# Patient Record
Sex: FEMALE | Race: WHITE | Employment: OTHER | URBAN - METROPOLITAN AREA
[De-identification: names, ages, dates, MRNs, and addresses within clinical notes are randomized per-mention and may not be internally consistent; named-entity substitution may affect disease eponyms.]

---

## 2023-04-25 ENCOUNTER — ANESTHESIA EVENT (OUTPATIENT)
Dept: ENDOSCOPY | Age: 78
End: 2023-04-25
Payer: MEDICARE

## 2023-04-26 ENCOUNTER — HOSPITAL ENCOUNTER (OUTPATIENT)
Age: 78
Setting detail: OUTPATIENT SURGERY
Discharge: HOME | End: 2023-04-26
Attending: INTERNAL MEDICINE | Admitting: INTERNAL MEDICINE
Payer: MEDICARE

## 2023-04-26 ENCOUNTER — ANESTHESIA (OUTPATIENT)
Dept: ENDOSCOPY | Age: 78
End: 2023-04-26
Payer: MEDICARE

## 2023-04-26 VITALS
DIASTOLIC BLOOD PRESSURE: 73 MMHG | RESPIRATION RATE: 14 BRPM | SYSTOLIC BLOOD PRESSURE: 114 MMHG | HEIGHT: 60 IN | BODY MASS INDEX: 25.13 KG/M2 | WEIGHT: 128 LBS | OXYGEN SATURATION: 97 % | TEMPERATURE: 97.1 F | HEART RATE: 81 BPM

## 2023-04-26 PROCEDURE — 3700000000 HC ANESTHESIA ATTENDED CARE: Performed by: INTERNAL MEDICINE

## 2023-04-26 PROCEDURE — 2580000003 HC RX 258: Performed by: NURSE ANESTHETIST, CERTIFIED REGISTERED

## 2023-04-26 PROCEDURE — 2580000003 HC RX 258: Performed by: ANESTHESIOLOGY

## 2023-04-26 PROCEDURE — 6360000002 HC RX W HCPCS: Performed by: NURSE ANESTHETIST, CERTIFIED REGISTERED

## 2023-04-26 PROCEDURE — 7100000010 HC PHASE II RECOVERY - FIRST 15 MIN: Performed by: INTERNAL MEDICINE

## 2023-04-26 PROCEDURE — 2709999900 HC NON-CHARGEABLE SUPPLY: Performed by: INTERNAL MEDICINE

## 2023-04-26 PROCEDURE — 7100000011 HC PHASE II RECOVERY - ADDTL 15 MIN: Performed by: INTERNAL MEDICINE

## 2023-04-26 PROCEDURE — 3609017100 HC EGD: Performed by: INTERNAL MEDICINE

## 2023-04-26 PROCEDURE — 3700000001 HC ADD 15 MINUTES (ANESTHESIA): Performed by: INTERNAL MEDICINE

## 2023-04-26 RX ORDER — TIZANIDINE 2 MG/1
TABLET ORAL
COMMUNITY
Start: 2023-02-13

## 2023-04-26 RX ORDER — LIDOCAINE HYDROCHLORIDE 20 MG/ML
INJECTION, SOLUTION INTRAVENOUS PRN
Status: DISCONTINUED | OUTPATIENT
Start: 2023-04-26 | End: 2023-04-26 | Stop reason: SDUPTHER

## 2023-04-26 RX ORDER — CELECOXIB 200 MG/1
200 CAPSULE ORAL 2 TIMES DAILY
COMMUNITY
Start: 2023-03-25

## 2023-04-26 RX ORDER — M-VIT,TX,IRON,MINS/CALC/FOLIC 27MG-0.4MG
1 TABLET ORAL DAILY
COMMUNITY

## 2023-04-26 RX ORDER — LOSARTAN POTASSIUM 100 MG/1
100 TABLET ORAL DAILY
COMMUNITY
Start: 2023-02-21

## 2023-04-26 RX ORDER — LIDOCAINE 50 MG/G
PATCH TOPICAL
COMMUNITY
Start: 2023-03-09

## 2023-04-26 RX ORDER — VITAMIN E 268 MG
1 CAPSULE ORAL
COMMUNITY

## 2023-04-26 RX ORDER — SODIUM CHLORIDE, SODIUM LACTATE, POTASSIUM CHLORIDE, CALCIUM CHLORIDE 600; 310; 30; 20 MG/100ML; MG/100ML; MG/100ML; MG/100ML
INJECTION, SOLUTION INTRAVENOUS CONTINUOUS
Status: DISCONTINUED | OUTPATIENT
Start: 2023-04-26 | End: 2023-04-26 | Stop reason: HOSPADM

## 2023-04-26 RX ORDER — DULOXETIN HYDROCHLORIDE 30 MG/1
CAPSULE, DELAYED RELEASE ORAL DAILY
COMMUNITY
Start: 2023-03-28

## 2023-04-26 RX ORDER — HYDROCHLOROTHIAZIDE 25 MG/1
25 TABLET ORAL EVERY MORNING
COMMUNITY
Start: 2023-01-16

## 2023-04-26 RX ORDER — AZITHROMYCIN MONOHYDRATE 10 MG/ML
SOLUTION/ DROPS OPHTHALMIC
COMMUNITY
Start: 2019-11-21

## 2023-04-26 RX ORDER — ASPIRIN 81 MG/1
81 TABLET ORAL DAILY
COMMUNITY
Start: 2022-11-13

## 2023-04-26 RX ORDER — TRAMADOL HYDROCHLORIDE 50 MG/1
TABLET ORAL
COMMUNITY
Start: 2023-04-14

## 2023-04-26 RX ORDER — CHLORAL HYDRATE 500 MG
2000 CAPSULE ORAL EVERY MORNING
COMMUNITY

## 2023-04-26 RX ORDER — HYDROXYZINE HYDROCHLORIDE 10 MG/1
TABLET, FILM COATED ORAL
COMMUNITY
Start: 2022-10-04

## 2023-04-26 RX ORDER — SIMVASTATIN 40 MG
40 TABLET ORAL EVERY EVENING
COMMUNITY
Start: 2023-03-30

## 2023-04-26 RX ORDER — FLUTICASONE PROPIONATE 50 MCG
SPRAY, SUSPENSION (ML) NASAL
COMMUNITY
Start: 2023-03-25

## 2023-04-26 RX ORDER — PROPOFOL 10 MG/ML
INJECTION, EMULSION INTRAVENOUS PRN
Status: DISCONTINUED | OUTPATIENT
Start: 2023-04-26 | End: 2023-04-26 | Stop reason: SDUPTHER

## 2023-04-26 RX ORDER — SODIUM CHLORIDE, SODIUM LACTATE, POTASSIUM CHLORIDE, CALCIUM CHLORIDE 600; 310; 30; 20 MG/100ML; MG/100ML; MG/100ML; MG/100ML
INJECTION, SOLUTION INTRAVENOUS CONTINUOUS PRN
Status: DISCONTINUED | OUTPATIENT
Start: 2023-04-26 | End: 2023-04-26 | Stop reason: SDUPTHER

## 2023-04-26 RX ORDER — PANTOPRAZOLE SODIUM 40 MG/1
TABLET, DELAYED RELEASE ORAL
COMMUNITY
Start: 2023-03-02

## 2023-04-26 RX ORDER — LACTOBACILLUS RHAMNOSUS GG 10B CELL
1 CAPSULE ORAL DAILY
COMMUNITY

## 2023-04-26 RX ORDER — PROPOFOL 10 MG/ML
INJECTION, EMULSION INTRAVENOUS CONTINUOUS PRN
Status: DISCONTINUED | OUTPATIENT
Start: 2023-04-26 | End: 2023-04-26 | Stop reason: SDUPTHER

## 2023-04-26 RX ORDER — TRAZODONE HYDROCHLORIDE 50 MG/1
TABLET ORAL
COMMUNITY
Start: 2023-02-21

## 2023-04-26 RX ADMIN — SODIUM CHLORIDE, SODIUM LACTATE, POTASSIUM CHLORIDE, AND CALCIUM CHLORIDE: .6; .31; .03; .02 INJECTION, SOLUTION INTRAVENOUS at 13:57

## 2023-04-26 RX ADMIN — PROPOFOL 50 MG: 10 INJECTION, EMULSION INTRAVENOUS at 14:05

## 2023-04-26 RX ADMIN — LIDOCAINE HYDROCHLORIDE 100 MG: 20 INJECTION, SOLUTION INTRAVENOUS at 14:05

## 2023-04-26 RX ADMIN — SODIUM CHLORIDE, POTASSIUM CHLORIDE, SODIUM LACTATE AND CALCIUM CHLORIDE: 600; 310; 30; 20 INJECTION, SOLUTION INTRAVENOUS at 12:40

## 2023-04-26 RX ADMIN — PROPOFOL 150 MCG/KG/MIN: 10 INJECTION, EMULSION INTRAVENOUS at 14:05

## 2023-04-26 ASSESSMENT — PAIN SCALES - GENERAL: PAINLEVEL_OUTOF10: 0

## 2023-04-26 ASSESSMENT — PAIN - FUNCTIONAL ASSESSMENT: PAIN_FUNCTIONAL_ASSESSMENT: 0-10

## 2023-04-26 ASSESSMENT — PAIN SCALES - WONG BAKER: WONGBAKER_NUMERICALRESPONSE: 0

## 2023-04-26 NOTE — ANESTHESIA POSTPROCEDURE EVALUATION
Department of Anesthesiology  Postprocedure Note    Patient: Matthew López  MRN: [id number]  YOB: 1945  Date of evaluation: 4/26/2023      Procedure Summary     Date: 04/26/23 Room / Location: [facility]    Anesthesia Start: 3205 Anesthesia Stop: 7012    Procedure: ESOPHAGOGASTRODUODENOSCOPY Diagnosis:       Melena      (Melena [K92.1])    Surgeons: Winifred Zhou MD Responsible Provider: Delfino Santana DO    Anesthesia Type: MAC ASA Status: 2          Anesthesia Type: No value filed.     Navi Phase I: Navi Score: 10    Navi Phase II: Navi Score: 10      Anesthesia Post Evaluation    Patient location during evaluation: PACU  Patient participation: complete - patient participated  Level of consciousness: awake and alert  Airway patency: patent  Nausea & Vomiting: no nausea and no vomiting  Complications: no  Cardiovascular status: hemodynamically stable  Respiratory status: acceptable  Hydration status: euvolemic  Multimodal analgesia pain management approach

## 2023-04-26 NOTE — ANESTHESIA PRE PROCEDURE
Department of Anesthesiology  Preprocedure Note       Name:  Marilyn Galvez   Age:  77 y.o.  :  1945                                          MRN:  [id number]         Date:  2023      Surgeon: Silvano Grimm):  Roselia Barcenas MD    Procedure: Procedure(s):  ESOPHAGOGASTRODUODENOSCOPY    Medications prior to admission:   Prior to Admission medications    Medication Sig Start Date End Date Taking? Authorizing Provider   aspirin 81 MG EC tablet Take 1 tablet by mouth daily 22  Yes Historical Provider, MD   azithromycin (AZASITE) 1 % ophthalmic solution Apply 1 drop by Topical route to eyelid margins both eyes bedtime 3 x weekly 19  Yes Historical Provider, MD   hydroCHLOROthiazide (HYDRODIURIL) 25 MG tablet Take 1 tablet by mouth every morning 23  Yes Historical Provider, MD   hydrOXYzine HCl (ATARAX) 10 MG tablet 1-2 po tid prn anxiety 10/4/22  Yes Historical Provider, MD   Calcium-Cholecalciferol 200-6.25 MG-MCG TABS Take 1 tablet by mouth daily    Historical Provider, MD   celecoxib (CELEBREX) 200 MG capsule Take 1 capsule by mouth 2 times daily 3/25/23   Historical Provider, MD   cyanocobalamin 100 MCG tablet Take 1 tablet by mouth daily    Historical Provider, MD   DULoxetine (CYMBALTA) 30 MG extended release capsule Take by mouth daily 3/28/23   Historical Provider, MD   fluticasone (FLONASE) 50 MCG/ACT nasal spray SPRAY 2 SPRAYS INTO EACH NOSTRIL EVERY DAY 3/25/23   Historical Provider, MD   lactobacillus (CULTURELLE) CAPS capsule Take 1 capsule by mouth daily    Historical Provider, MD   lidocaine (LIDODERM) 5 % APPLY 3 PATCHES DAILY.  LEAVE ON FOR 12 HOURS THEN LEAVE OFF FOR 12 HOURS 3/9/23   Historical Provider, MD   losartan (COZAAR) 100 MG tablet Take 1 tablet by mouth daily 23   Historical Provider, MD   Multiple Vitamins-Minerals (THERAPEUTIC MULTIVITAMIN-MINERALS) tablet Take 1 tablet by mouth daily    Historical Provider, MD   Omega-3 Fatty Acids (FISH OIL) 1000 MG

## 2023-04-26 NOTE — OP NOTE
Endoscopy Note    Patient: Sudheer Peres  : 1945  CSN:     Procedure: Esophagogastroduodenoscopy     Date:  2023     Surgeon:  Nicolasa Roper MD     Referring Physician: Glenny Contreras MD     Preoperative Diagnosis:  Melena    Postoperative Diagnosis:  Hiatal hernia, duodenal erosion    Anesthesia:  MAC per anesthesia record     EBL: <5 mL    Indications: This is a 77y.o. year old female who presents today with history of intermittent melenic stools. Chronic NSAID/aspirin use and prior Nissen fundoplication. Procedure:  Informed consent was obtained from the patient after explanation of indications, benefits and possible risks and complications of the procedure. The patient was then taken to the endoscopy suite, placed in the left lateral decubitus position, and the above IV sedation was administrered. The Olympus gastroscope was passed through the hypopharynx into the esophagus. The scope was advanced to the second portion of the duodenum. The mucosa was carefully examined, including gastric retroflexion. The scope was withdrawn and the procedure was terminated with findings as indicated below:     Findings:   Esophagus:   -Z-line was noted at 36cm with a 3cm hiatal hernia noted. -The esophagus was otherwise normal. No evidence of esophagitis or Lees's esophagus. Stomach:   -Partially intact Nissen fundoplication noted on retroflexion. 60% of the wrap appeared to be intact.   -The stomach was otherwise normal.     Duodenum:   -A single 2mm erosion was noted in the duodenal bulb with normal surrounding mucosa. -The examined duodenum was otherwise normal.     Gastric or Duodenal ulcer present: No      The patient tolerated the procedure well and was taken to the post anesthesia care unit in good condition. Complications: No immediate complications.      Impression:    -Partially intact Nissen fundoplication   -Small hiatal hernia   -Isolated duodenal erosion

## 2023-04-26 NOTE — DISCHARGE INSTRUCTIONS
ENDOSCOPY DISCHARGE INSTRUCTIONS:    Call the physician that did your procedure for any questions or concern:    [facility]: [phone]  DR. TREMAYNE WAKEFIELD      ACTIVITY:    There are potential side effects to the medications used for sedation and anesthesia during your procedure. These include:  Dizziness or light-headedness, confusion or memory loss, delayed reaction times, loss of coordination, nausea and vomiting. Because of your increased risk for injury, we ask that you observe the following precautions: For the next 24 hours,  DO NOT operate an automobile, bicycle, motorcycle, , power tools or large equipment of any kind. Do not drink alcohol, sign any legal documents or make any legal decisions for 24 hours. Do not bend your head over lower than your heart. DO sit on the side of bed/couch awhile before getting up. Plan on bedrest or quiet relaxation today. You may resume normal activities in 24 hours. DIET:    Your first meal today should be light, avoiding spicy and fatty foods. If you tolerate this first meal, then you may advance to your regular diet unless otherwise advised by your physician. NORMAL SYMPTOMS:  -Mild sore throat if youve had an EGD   -Gaseous discomfort    NOTIFY YOUR PHYSICIAN IF THESE SYMPTOMS OCCUR:  1. Fever (greater than 100)  5. Increased abdominal bloating  2. Severe pain    6. Excessive bleeding  3. Nausea and vomiting  7. Chest pain                                                                    4. Chills    8. Shortness of breath    ADDITIONAL INSTRUCTIONS:    Biopsy results: NONE    Educational Information:          Please review these discharge instructions this evening or tomorrow for  information you may have forgotten. We want to thank you for choosing the [facility] as your health care provider. We always strive to provide you with excellent care while you are here.  You may receive a survey in the mail regarding your

## 2023-04-26 NOTE — H&P
Gastroenterology Note             Pre-operative History and Physical    Patient: Adri Hong  : 1945  CSN: [id number]    History Obtained From:  Patient and/or guardian. HISTORY OF PRESENT ILLNESS:    Indication: The patient is a 77 y.o. female  here for EGD. Prior history of intermittent melena. Chronic ASA / NSAID use. Past Medical History:    Past Medical History:   Diagnosis Date    [place] (hard of hearing)     Hyperlipidemia     Hypertension     Spinal cord stimulator status     TIA (transient ischemic attack)      Past Surgical History:    Past Surgical History:   Procedure Laterality Date    JOINT REPLACEMENT       Medications Prior to Admission:   No current facility-administered medications on file prior to encounter. Current Outpatient Medications on File Prior to Encounter   Medication Sig Dispense Refill    aspirin 81 MG EC tablet Take 1 tablet by mouth daily      azithromycin (AZASITE) 1 % ophthalmic solution Apply 1 drop by Topical route to eyelid margins both eyes bedtime 3 x weekly      hydroCHLOROthiazide (HYDRODIURIL) 25 MG tablet Take 1 tablet by mouth every morning      hydrOXYzine HCl (ATARAX) 10 MG tablet 1-2 po tid prn anxiety      Calcium-Cholecalciferol 200-6.25 MG-MCG TABS Take 1 tablet by mouth daily      celecoxib (CELEBREX) 200 MG capsule Take 1 capsule by mouth 2 times daily      cyanocobalamin 100 MCG tablet Take 1 tablet by mouth daily      DULoxetine (CYMBALTA) 30 MG extended release capsule Take by mouth daily      fluticasone (FLONASE) 50 MCG/ACT nasal spray SPRAY 2 SPRAYS INTO EACH NOSTRIL EVERY DAY      lactobacillus (CULTURELLE) CAPS capsule Take 1 capsule by mouth daily      lidocaine (LIDODERM) 5 % APPLY 3 PATCHES DAILY.  LEAVE ON FOR 12 HOURS THEN LEAVE OFF FOR 12 HOURS      losartan (COZAAR) 100 MG tablet Take 1 tablet by mouth daily      Multiple Vitamins-Minerals (THERAPEUTIC MULTIVITAMIN-MINERALS) tablet Take 1 tablet by mouth daily

## 2023-04-26 NOTE — PROGRESS NOTES
Ambulatory Surgery/Procedure Discharge Note    Vitals:    04/26/23 1430   BP: 114/73   Pulse: 81   Resp: 14   Temp: 97.1 °F (36.2 °C)   SpO2: 97%       In: 400 [I.V.:400]  Out: -     Restroom use offered before discharge. Yes  Pt tolerates PO well and denies nausea. Toileted and remains safe. Discharge instructions  were read at bedside. Pain assessment:  none  Pain Level: 0        Patient discharged to home/self care.  Patient discharged via wheel chair by this nurse to waiting family/S.O.       4/26/2023 3:12 PM

## 2024-09-24 RX ORDER — CEPHALEXIN 500 MG/1
500 CAPSULE ORAL 2 TIMES DAILY
COMMUNITY

## 2024-09-24 RX ORDER — LEVOTHYROXINE SODIUM 50 UG/1
50 TABLET ORAL DAILY
COMMUNITY

## 2024-09-24 NOTE — PROGRESS NOTES
[facility] PRE-OPERATIVE INSTRUCTIONS       DOS: __10/4/24__        Pre-Op Instructions     Patients receiving local anesthetic only will arrive one hour prior to the procedure, all other patients will arrive 1.5 hours prior to procedure time.    [x]  A History and Physical will be required within 30 days prior to surgery date. Some patients may require cardiac or pulmonary clearance. H&P will be completed DOS for Endo/colonoscopy patients.     [x]  Reviewed Medical and Surgical history, medication list, confirmed with patient any implants, allergies, bleeding disorders, YESICA and reactions to Anesthesia.    [x]  If there is a change in physical condition between now and the day of surgery, please notify your surgeon. This includes a cough, cold, fever, sore throat, nausea, vomiting and diarrhea. Also notify your surgeon if you experience dizziness, shortness of breath or blurred vision.    [x] Reviewed hx of C-Diff, MRSA, VRE and/or recent use of Antibiotics     [x]  All patients having a procedure must have a ride home by a responsible person that is over the age of 18 and ensure it is someone that we can share medical information with. After discharge, a responsible adult needs to stay with you for 24 hours. There is a limit of 2 adult visitors per room.     If unable to secure ride and/or care taker, please contact surgeon's office.      [x]  No alcohol, smoking or marijuana use 24 hours prior to surgery. Any use of recreational drugs must be stopped 5 days prior to surgery.     [x]  NPO after midnight (Any heart, BP, seizure, thyroid and breathing medications are okay to take the morning of surgery with a small sip of water 4 hours prior to procedure).    The morning of surgery, you may brush your teeth, just no swallowing water. Also, NO gum, candy, mints or ice chips.    [x]  For Colonoscopy's, follow prep-instructions as indicated by physician.     []  Patients with a insulin pump, keep set on

## 2024-10-03 ENCOUNTER — ANESTHESIA EVENT (OUTPATIENT)
Age: 79
End: 2024-10-03
Payer: MEDICARE

## 2024-10-04 ENCOUNTER — ANESTHESIA (OUTPATIENT)
Age: 79
End: 2024-10-04
Payer: MEDICARE

## 2024-10-04 ENCOUNTER — HOSPITAL ENCOUNTER (OUTPATIENT)
Age: 79
Setting detail: OUTPATIENT SURGERY
Discharge: HOME | End: 2024-10-04
Attending: INTERNAL MEDICINE | Admitting: INTERNAL MEDICINE
Payer: MEDICARE

## 2024-10-04 VITALS
TEMPERATURE: 98.1 F | RESPIRATION RATE: 14 BRPM | HEART RATE: 83 BPM | OXYGEN SATURATION: 98 % | WEIGHT: 130 LBS | BODY MASS INDEX: 25.52 KG/M2 | SYSTOLIC BLOOD PRESSURE: 84 MMHG | HEIGHT: 60 IN | DIASTOLIC BLOOD PRESSURE: 49 MMHG

## 2024-10-04 DIAGNOSIS — R19.4 CHANGE IN BOWEL HABITS: ICD-10-CM

## 2024-10-04 DIAGNOSIS — K58.1 IRRITABLE BOWEL SYNDROME WITH CONSTIPATION: ICD-10-CM

## 2024-10-04 DIAGNOSIS — K64.2 THIRD DEGREE HEMORRHOIDS: ICD-10-CM

## 2024-10-04 PROCEDURE — 3700000000 HC ANESTHESIA ATTENDED CARE: Performed by: INTERNAL MEDICINE

## 2024-10-04 PROCEDURE — 3700000001 HC ADD 15 MINUTES (ANESTHESIA): Performed by: INTERNAL MEDICINE

## 2024-10-04 PROCEDURE — 6360000002 HC RX W HCPCS: Performed by: NURSE ANESTHETIST, CERTIFIED REGISTERED

## 2024-10-04 PROCEDURE — 3609010600 HC COLONOSCOPY POLYPECTOMY SNARE/COLD BIOPSY: Performed by: INTERNAL MEDICINE

## 2024-10-04 PROCEDURE — 2580000003 HC RX 258: Performed by: ANESTHESIOLOGY

## 2024-10-04 PROCEDURE — 2580000003 HC RX 258: Performed by: NURSE ANESTHETIST, CERTIFIED REGISTERED

## 2024-10-04 PROCEDURE — 2720000010 HC SURG SUPPLY STERILE: Performed by: INTERNAL MEDICINE

## 2024-10-04 PROCEDURE — 2709999900 HC NON-CHARGEABLE SUPPLY: Performed by: INTERNAL MEDICINE

## 2024-10-04 PROCEDURE — 7100000011 HC PHASE II RECOVERY - ADDTL 15 MIN: Performed by: INTERNAL MEDICINE

## 2024-10-04 PROCEDURE — 88305 TISSUE EXAM BY PATHOLOGIST: CPT

## 2024-10-04 PROCEDURE — 7100000010 HC PHASE II RECOVERY - FIRST 15 MIN: Performed by: INTERNAL MEDICINE

## 2024-10-04 RX ORDER — SODIUM CHLORIDE 9 MG/ML
INJECTION, SOLUTION INTRAVENOUS PRN
Status: DISCONTINUED | OUTPATIENT
Start: 2024-10-04 | End: 2024-10-04 | Stop reason: HOSPADM

## 2024-10-04 RX ORDER — SODIUM CHLORIDE 0.9 % (FLUSH) 0.9 %
5-40 SYRINGE (ML) INJECTION EVERY 12 HOURS SCHEDULED
Status: DISCONTINUED | OUTPATIENT
Start: 2024-10-04 | End: 2024-10-04 | Stop reason: HOSPADM

## 2024-10-04 RX ORDER — NALOXONE HYDROCHLORIDE 0.4 MG/ML
INJECTION, SOLUTION INTRAMUSCULAR; INTRAVENOUS; SUBCUTANEOUS PRN
Status: DISCONTINUED | OUTPATIENT
Start: 2024-10-04 | End: 2024-10-04 | Stop reason: HOSPADM

## 2024-10-04 RX ORDER — SODIUM CHLORIDE 0.9 % (FLUSH) 0.9 %
5-40 SYRINGE (ML) INJECTION PRN
Status: DISCONTINUED | OUTPATIENT
Start: 2024-10-04 | End: 2024-10-04 | Stop reason: HOSPADM

## 2024-10-04 RX ORDER — LIDOCAINE HYDROCHLORIDE 20 MG/ML
INJECTION, SOLUTION INTRAVENOUS
Status: DISCONTINUED | OUTPATIENT
Start: 2024-10-04 | End: 2024-10-04 | Stop reason: SDUPTHER

## 2024-10-04 RX ORDER — SODIUM CHLORIDE 9 MG/ML
INJECTION, SOLUTION INTRAVENOUS
Status: DISCONTINUED | OUTPATIENT
Start: 2024-10-04 | End: 2024-10-04 | Stop reason: SDUPTHER

## 2024-10-04 RX ORDER — PROPOFOL 10 MG/ML
INJECTION, EMULSION INTRAVENOUS
Status: DISCONTINUED | OUTPATIENT
Start: 2024-10-04 | End: 2024-10-04 | Stop reason: SDUPTHER

## 2024-10-04 RX ORDER — ONDANSETRON 2 MG/ML
4 INJECTION INTRAMUSCULAR; INTRAVENOUS
Status: DISCONTINUED | OUTPATIENT
Start: 2024-10-04 | End: 2024-10-04 | Stop reason: HOSPADM

## 2024-10-04 RX ADMIN — PROPOFOL 120 MCG/KG/MIN: 10 INJECTION, EMULSION INTRAVENOUS at 09:57

## 2024-10-04 RX ADMIN — SODIUM CHLORIDE: 9 INJECTION, SOLUTION INTRAVENOUS at 09:51

## 2024-10-04 RX ADMIN — PROPOFOL 50 MG: 10 INJECTION, EMULSION INTRAVENOUS at 09:56

## 2024-10-04 RX ADMIN — SODIUM CHLORIDE 1000 ML: 9 INJECTION, SOLUTION INTRAVENOUS at 09:04

## 2024-10-04 RX ADMIN — LIDOCAINE HYDROCHLORIDE 60 MG: 20 INJECTION, SOLUTION INTRAVENOUS at 09:56

## 2024-10-04 ASSESSMENT — PAIN - FUNCTIONAL ASSESSMENT
PAIN_FUNCTIONAL_ASSESSMENT: 0-10
PAIN_FUNCTIONAL_ASSESSMENT: ACTIVITIES ARE NOT PREVENTED

## 2024-10-04 NOTE — PERIOP NOTE
Discharge instructions review with patient and spouse. All home medications have been reviewed, pt v/u. Pt discharged via wheelchair. Pt discharged with all belongings including the cases for her hearing aides and glasses. Livan taking stable pt home.

## 2024-10-04 NOTE — PERIOP NOTE
Pt arrived from Endo procedure to SDS room 5. Reported received from OR staff. Pt became arousable to voice before all monitor cables were connected. No surgical incisions to assess. Pt on RA, denies any discomfort; NSR on monitor, and VSS. Will continue to monitor. Patient's  is walking around the hospital and was not able to be updated by Dr Schmidt at bedside

## 2024-10-04 NOTE — DISCHARGE INSTRUCTIONS
COLONSCOPY DISCHARGE INSTRUCTIONS    You may experience some lightheadedness for the next several hours.  Plan on quiet relaxation for the rest of today. Nap for four hours following procedure if possible.  A responsible adult needs to stay with you today.    Eat bland food and avoid anything greasy or spicy initially-progress to your normal diet gradually.  Diet restrictions as instructed.  You may resume home medications as instructed.    It is not unusual to experience some mild cramping or gas pains, and you may not have a bowel movement for several days.  If you had a polyp removed, avoid strenuous activity for 48 hours.  Avoid the use of aspirin or related compounds for one week, unless otherwise instructed by your physician.    You may notice a small amount of blood in your next few bowel movements, but if a large amount passes, call your physician.    If you have any of the following problems, notify your physician or return to the hospital emergency room : fever, chills, excessive bleeding, excessive vomiting, difficulty swallowing, uncontrolled pain, increased abdominal distention, shortness of breath or any other problems.    Call your doctor at [phone] if you have any concerns.     If you had any biopsies or polyps call for results in 5-7 business days.    See your physician's report for details about your procedure and recommendations.      ANESTHESIA DISCHARGE INSTRUCTIONS    Wear your seatbelt home.    You are under the influence of drugs-do not drink alcohol, drive ,operate machinery,or make any important decisions or sign any legal documentsfor 24 hours. You may resume normal activities tomorrow.    A responsible adult needs to be with you for 24 hours.    You may experience lightheadedness,dizziness,or sleepiness following surgery.    Rest at home today- increase activity as tolerated. It is recommended to take a four hour nap after procedure.    Progress slowly to a regular diet unless your  physician has instructed you otherwise. Avoid spicy and greasy food on first meal.  Drink plenty of water.    If nausea becomes a problem call your physician.

## 2024-10-04 NOTE — ANESTHESIA POSTPROCEDURE EVALUATION
[facility] Department of Anesthesiology  Post-Anesthesia Note       Name:  Yusra Muir                                  Age:  79 y.o.  MRN:  [id number]     Last Vitals & Oxygen Saturation: BP (!) 84/49   Pulse 83   Temp 98.1 °F (36.7 °C) (Infrared)   Resp 14   Ht 1.524 m (5')   Wt 59 kg (130 lb)   SpO2 98%   BMI 25.39 kg/m²   Patient Vitals for the past 4 hrs:   BP Temp Temp src Pulse Resp SpO2   10/04/24 1045 -- 98.1 °F (36.7 °C) Infrared -- -- 98 %   10/04/24 1030 -- 98 °F (36.7 °C) Infrared -- -- 99 %   10/04/24 1025 (!) 84/49 -- -- 83 14 99 %   10/04/24 1022 (!) 116/54 97.7 °F (36.5 °C) Infrared 82 12 99 %   10/04/24 0841 (!) 157/82 98.1 °F (36.7 °C) Infrared 96 20 99 %       Level of consciousness:  Awake, alert    Respiratory: Respirations easy, no distress. Stable.    Cardiovascular: Hemodynamically stable.    Hydration: Adequate.    PONV: Adequately managed.    Post-op pain: Adequately controlled.    Post-op assessment: Tolerated anesthetic well without complication.    Complications:  None.    Vasiliy Choi MD  October 4, 2024   12:01 PM

## 2024-10-04 NOTE — OP NOTE
[facility]  Patient: PRATIBHA TAY  MRN: [id number]  : 1945  Account: [id number]  Sex at Birth: Female  Age: 79 Years  Procedure: Colonoscopy  Date: 10/4/2024  Attending Physician: MARIE ESTRADA  Indications:         -  Rectal bleeding  Medications:         -  See the Anesthesia note for documentation of the administered medications  Complications:         -  No immediate complications.  Estimated Blood Loss:         -  Estimated blood loss was minimal.  Procedure:         - The CF colon scope was introduced through the anus and advanced to the            cecum, identified by appendiceal orifice and ileocecal valve.         -  The patient tolerated the procedure well.         -  The quality of the bowel preparation was good.         -  The ileocecal valve, appendiceal orifice, and rectum were photographed.  Findings:         -  A 16 mm polyp was found in the cecum.  The polyp was sessile.  The polyp            was removed with a hot snare.   Resection and retrieval were complete.  To            close a defect after polypectomy, one hemostatic clip was successfully placed.            There was no bleeding at the end of the procedure.         -  Two sessile polyps were found in the ascending colon.  The polyps were 4 to            6 mm in size.  These polyps were removed with a cold snare.  Resection and            retrieval were complete.         -  A few small-mouthed diverticula were found in the sigmoid colon.         -  Internal hemorrhoids were found during retroflexion.  The hemorrhoids were            moderate.  Impression:         -  One 16 mm polyp in the cecum, removed with a hot snare.  Resected and            retrieved.  Clip was placed.         -  Two 4 to 6 mm polyps in the ascending colon, removed with a cold snare.            Resected and retrieved.         -  Diverticulosis in the sigmoid colon.         -  Internal hemorrhoids.  Recommendation:         -  Await pathology results.          -  No repeat colonoscopy due to age.  Procedure Code(s):         - 39233, Colonoscopy, flexible; with removal of tumor(s), polyp(s), or other            lesion(s) by snare technique  Diagnosis Code(s):         - K62.5, Hemorrhage of anus and rectum         - D12.0, Benign neoplasm of cecum         - D12.2, Benign neoplasm of ascending colon         - K64.8, Other hemorrhoids         - K57.30, Diverticulosis of large intestine without perforation or abscess            without bleeding        CPT(R) - 2023 copyright [employer]. All Rights Reserved.        The CPT codes, CCI edits and ICD codes generated are intended as suggestions        and were generated based on input data.  These codes are preliminary and upon         review may be revised to meet current compliance and payer requirements.        The provider is responsible for the final determination of appropriate codes,        and modifiers.  Scope Withdrawal Time:       00:10:44  Dr. Yuliya Schmidt  This document has been electronically signed.  Note Initiated:10/4/2024  Note Completed:10/4/2024 10:16 AM

## 2024-10-04 NOTE — H&P
Health     Financial Resource Strain: Not on file   Food Insecurity: Not on file   Transportation Needs: Not on file   Physical Activity: Not on file   Stress: Not on file   Social Connections: Moderately Integrated (10/4/2024)    Social Connections ([facility] HRSN)     If for any reason you need help with day-to-day activities such as bathing, preparing meals, shopping, managing finances, etc., do you get the help you need?: Not on file   Intimate Partner Violence: Not on file   Housing Stability: Not on file       Family History: History reviewed. No pertinent family history.    Home Medications:   Prior to Admission medications    Medication Sig Start Date End Date Taking? Authorizing Provider   traZODone (DESYREL) 50 MG tablet TAKE 1-2 TABLETS BY MOUTH AT BEDTIME AS NEEDED FOR INSOMNIA 2/21/23  Yes Marvin Navarro MD   levothyroxine (SYNTHROID) 50 MCG tablet Take 1 tablet by mouth Daily    Marvin Navarro MD   cephALEXin (KEFLEX) 500 MG capsule Take 1 capsule by mouth 2 times daily For 7 days for UTI starting 9/18/24  Patient not taking: Reported on 10/4/2024    Marvin Navarro MD   aspirin 81 MG EC tablet Take 1 tablet by mouth daily 11/13/22   Marvin Navarro MD   Calcium-Cholecalciferol 200-6.25 MG-MCG TABS Take 1 tablet by mouth daily    Marvin Navarro MD   celecoxib (CELEBREX) 200 MG capsule Take 1 capsule by mouth 2 times daily 3/25/23   Marvin Navarro MD   cyanocobalamin 100 MCG tablet Take 1 tablet by mouth daily    Marvin Navarro MD   DULoxetine (CYMBALTA) 30 MG extended release capsule Take by mouth daily 3/28/23   Marvin Navarro MD   fluticasone (FLONASE) 50 MCG/ACT nasal spray SPRAY 2 SPRAYS INTO EACH NOSTRIL EVERY DAY 3/25/23   Marvin Navarro MD   hydroCHLOROthiazide (HYDRODIURIL) 25 MG tablet Take 1 tablet by mouth every morning  Patient not taking: Reported on 10/4/2024 1/16/23   Marvin Navarro MD   hydrOXYzine HCl (ATARAX) 10 MG tablet  for procedure:Yes    9:42 AM 10/4/2024    Yuliya Schmidt MD      Please note that some or all of this record was generated using voice recognition software. If there are any questions about the content of this document, please contact the author as some errors in transcription may have occurred.

## 2024-10-04 NOTE — PROGRESS NOTES
Patient admitted to Pre-Op 05 in preparation for procedure, VSS. IV inserted into R hand, NS infusing. Belongings at bedside. NPO since 0500.

## 2024-10-04 NOTE — ANESTHESIA PRE PROCEDURE
Blocker         Neuro/Psych:   (+) CVA (no residual effects per pt):, TIA             ROS comment: Spinal cord stimulator GI/Hepatic/Renal:   (+) GERD: well controlled, bowel prep     (-) liver disease and no renal disease       Endo/Other:    (+) hypothyroidism::..    (-) diabetes mellitus, blood dyscrasia               Abdominal: normal exam            Vascular: negative vascular ROS.         Other Findings:             Anesthesia Plan      MAC     ASA 3       Induction: intravenous.      Anesthetic plan and risks discussed with patient and spouse.      Plan discussed with CRNA.                This pre-anesthesia assessment may be used as a history and physical.    DOS STAFF ADDENDUM:    Pt seen and examined, chart reviewed (including anesthesia, drug and allergy history).  No interval changes to history and physical examination.  Anesthetic plan, risks, benefits, alternatives, and personnel involved discussed with patient. Questions and concerns addressed.  Patient(family) verbalized an understanding and agrees to proceed.      Vasliiy Choi MD  October 4, 2024  8:32 AM

## 2024-10-08 LAB — SURGICAL PATHOLOGY REPORT: NORMAL

## (undated) DEVICE — SINGLE USE AIR/WATER, SUCTION AND BIOPSY VALVES SET: Brand: ORCAPOD™

## (undated) DEVICE — REPLAY HEMOSTASIS CLIP, 16MM SPAN: Brand: REPLAY

## (undated) DEVICE — SINGLE-USE POLYPECTOMY SNARE: Brand: CAPTIVATOR

## (undated) DEVICE — BW-412T DISP COMBO CLEANING BRUSH: Brand: SINGLE USE COMBINATION CLEANING BRUSH

## (undated) DEVICE — CANNULA SAMP CO2 AD GRN 7FT CO2 AND 7FT O2 TBNG UNIV CONN

## (undated) DEVICE — SNARE COLD DIAMOND 10MM THIN

## (undated) DEVICE — TUBING, SUCTION, 1/4" X 12', STRAIGHT: Brand: MEDLINE

## (undated) DEVICE — AIR/WATER CLEANING ADAPTER FOR OLYMPUS® GI ENDOSCOPE: Brand: BULLDOG®

## (undated) DEVICE — TRAP SPEC POLYP REM STRNR CLN DSGN MAGNIFYING WIND DISP

## (undated) DEVICE — SOLUTION IRRIG 1000ML STRL H2O USP PLAS POUR BTL

## (undated) DEVICE — ENDOSCOPIC KIT 1.1+ 6 FT 2 GWN AAMI LEVEL 3